# Patient Record
Sex: MALE | Race: WHITE | NOT HISPANIC OR LATINO | Employment: FULL TIME | URBAN - METROPOLITAN AREA
[De-identification: names, ages, dates, MRNs, and addresses within clinical notes are randomized per-mention and may not be internally consistent; named-entity substitution may affect disease eponyms.]

---

## 2019-06-17 ENCOUNTER — TELEPHONE (OUTPATIENT)
Dept: ORTHOPEDICS | Facility: CLINIC | Age: 29
End: 2019-06-17

## 2019-06-17 NOTE — TELEPHONE ENCOUNTER
----- Message from Luiza Arteaga sent at 6/17/2019  3:33 PM CDT -----  Contact: Patient  Returning your call [phone]

## 2019-06-17 NOTE — TELEPHONE ENCOUNTER
Was calling me back about Martín to see how he was dong since he called over the weekend. States he is doing better.